# Patient Record
(demographics unavailable — no encounter records)

---

## 2025-03-31 NOTE — HISTORY OF PRESENT ILLNESS
[N] : Patient does not use contraception [Y] : Positive pregnancy history [No] : Patient does not have concerns regarding sex [Currently Active] : currently active [Men] : men [TextBox_4] : 40 -year old  2 para 1 LMP 2025, presents to establish care and to discuss management of PCOS. She reports a history of irregular menses, and amenorrhea for 1 year.  Nonetheless, she states that her menses have been regular since discontinuing breast-feeding 2024.  She was diagnosed with PCOS at age 26 based on history of oligomenorrhea and a pelvic ultrasound.  Her only other symptoms related to PCOS was acne.  She conceived status post in vitro fertilization.  She also has a history of a missed  at 8 weeks status post IUI. [Mammogramdate] : 11/2024 [BreastSonogramDate] : 11/29/2024 [PapSmeardate] : 09/2024 [LMPDate] : 03/23/2025 [[facility]] : 1 [PGHxTotal] : 2 [[facility]] : 1 [PGHxABSpont] : 1 [FreeTextEntry1] : 03/23/2025

## 2025-03-31 NOTE — HISTORY OF PRESENT ILLNESS
[N] : Patient does not use contraception [Y] : Positive pregnancy history [No] : Patient does not have concerns regarding sex [Currently Active] : currently active [Men] : men [TextBox_4] : 40 -year old  2 para 1 LMP 2025, presents to establish care and to discuss management of PCOS. She reports a history of irregular menses, and amenorrhea for 1 year.  Nonetheless, she states that her menses have been regular since discontinuing breast-feeding 2024.  She was diagnosed with PCOS at age 26 based on history of oligomenorrhea and a pelvic ultrasound.  Her only other symptoms related to PCOS was acne.  She conceived status post in vitro fertilization.  She also has a history of a missed  at 8 weeks status post IUI. [Mammogramdate] : 11/2024 [BreastSonogramDate] : 11/29/2024 [PapSmeardate] : 09/2024 [LMPDate] : 03/23/2025 [[facility] CenterxFulerm] : 1 [PGHxTotal] : 2 [[facility]iving] : 1 [PGHxABSpont] : 1 [FreeTextEntry1] : 03/23/2025

## 2025-03-31 NOTE — PHYSICAL EXAM
[Chaperone Present] : A chaperone was present in the examining room during all aspects of the physical examination [Oriented x3] : oriented x3 [Examination Of The Breasts] : a normal appearance [No Masses] : no breast masses were palpable [Labia Majora] : normal [Labia Minora] : normal [Normal] : normal [Uterine Adnexae] : normal [FreeTextEntry2] :  Appropriately responsive, alert, and no acute distress. [FreeTextEntry3] :  Thyroid is non-enlarged, nontender. No palpable nodules or goiter. No lymphadenopathy. [FreeTextEntry7] :  Soft. Nondistended. Nontender. No rebound or guarding. There are no palpable masses. [FreeTextEntry1] :  External genitalia are within normal limits with no lesions visualized. [FreeTextEntry4] : Scant amount of brown vaginal discharge consistent with recent menstruation. No blood or lesions noted within the vaginal vault. [FreeTextEntry6] : Bimanual examination was notable for a normal, nontender uterus. There were no palpable adnexal masses or adnexal tenderness. [FreeTextEntry5] :  A speculum was inserted without any difficulty. The cervix was normal in appearance. No cervical motion tenderness.

## 2025-03-31 NOTE — PLAN
[FreeTextEntry1] : 40-year-old  2 para 1-0-1-1 presents with history of polycystic ovarian syndrome.  Her presenting symptoms have been oligomenorrhea and cystic acne. Physical examination is unremarkable. Previous blood work in 2024 was notable for an elevated hemoglobin A1c, and elevated LDL.  Reviewed potential complications of PCOS.  The importance of physical exercise and proper nutrition to prevent the risk of diabetes mellitus and hyperlipidemia were discussed. Information on PCOS given. Plan: Referral to nutritionist. Follow-up blood work.   Recommendations: ophthalmological, dental, and dermatological examinations.    Status post mammography and breast ultrasound 2024. Status post pap smear 2024. Reviewed age-appropriate vaccinations.  Patient actively trying to conceive. Last pregnancy was a result of IVF. Patient will continue to track her ovulation, and actively try to conceive without assistance. Recommend ART, if unsuccessful after 6 months.   Plan: blood work, including D3 FSH, pelvic sonogram   Total time of the consultation, with greater than 50% of the time devoted to complications of PCOS, and its impact on fertility, was 45 minutes. All of the patient's questions were answered to her satisfaction.

## 2025-05-01 NOTE — HISTORY OF PRESENT ILLNESS
[Currently Active] : currently active [Men] : men [TextBox_4] : 40 -year old  2 para 1011 LMP 2025, presents complaining of amenorrhea.  The patient has been trying to conceive.  She has a history of PCOS. [PapSmeardate] : 09/2024 [PGHxTotal] : 2 [[facility]xFul[facility]] : 1 [[facility] Centeriving] : 1 [PGHxABSpont] : 1

## 2025-05-01 NOTE — HISTORY OF PRESENT ILLNESS
[Currently Active] : currently active [Men] : men [TextBox_4] : 40 -year old  2 para 1011 LMP 2025, presents complaining of amenorrhea.  The patient has been trying to conceive.  She has a history of PCOS. [PapSmeardate] : 09/2024 [PGHxTotal] : 2 [[facility] CenterxF[facility]] : 1 [[facility] Centeriving] : 1 [PGHxABSpont] : 1

## 2025-05-01 NOTE — PLAN
[FreeTextEntry1] : 40 -year old  2 para 1 LMP 2024 presents for pregnancy test secondary to absence of menses in April.  She has a history of PCOS and desires to conceive.  In-office urine pregnancy test was negative.  Will obtain blood work.  Leukorrhea.  Vaginal cultures pending.

## 2025-05-01 NOTE — PHYSICAL EXAM
[Chaperoned Physical Exam] : A chaperone was present in the examining room during all aspects of the physical examination. [Oriented x3] : oriented x3 [Labia Majora] : normal [Labia Minora] : normal [Normal] : normal [FreeTextEntry2] :  Appropriately responsive, alert, and no acute distress. [FreeTextEntry7] :  Soft. Nondistended. Nontender. No rebound or guarding. There are no palpable masses. Back: No CVA tenderness [FreeTextEntry1] :  External genitalia are within normal limits with no lesions visualized. [FreeTextEntry4] : Moderate amount of white vaginal discharge. No blood or lesions noted within the vaginal vault. [FreeTextEntry5] :  A speculum was inserted without any difficulty. The cervix was normal in appearance. Culture obtained. No cervical motion tenderness. [FreeTextEntry6] : Bimanual examination was notable for a normal, nontender uterus. There were no palpable adnexal masses or adnexal tenderness.

## 2025-05-28 NOTE — PLAN
[FreeTextEntry1] : 39 yo  LMP 2025 presents for confirmation of viable intrauterine pregnancy.  Normal physical examination.    AMA. Cultures obtained.  Plan: TVUS. PNLs. Discussed proper prenatal nutrition. Information given

## 2025-05-28 NOTE — PHYSICAL EXAM
[Chaperoned Physical Exam] : A chaperone was present in the examining room during all aspects of the physical examination. [MA] : MA [Oriented x3] : oriented x3 [Examination Of The Breasts] : a normal appearance [No Masses] : no breast masses were palpable [Labia Majora] : normal [Labia Minora] : normal [Normal] : normal [Uterine Adnexae] : normal [FreeTextEntry2] :  Appropriately responsive, alert, and no acute distress. warm/dry [FreeTextEntry3] :  Thyroid is non-enlarged, nontender. No palpable nodules or goiter. No lymphadenopathy. [FreeTextEntry7] :  Soft. Nondistended. Nontender. No rebound or guarding. There are no palpable masses. [FreeTextEntry1] :  External genitalia are within normal limits with no lesions visualized. [FreeTextEntry4] :  No vaginal discharge, blood or any lesions noted within the vaginal vault. [FreeTextEntry5] :  A speculum was inserted without any difficulty. The cervix was normal in appearance. Cultures were obtained. No cervical motion tenderness. [FreeTextEntry6] : Bimanual examination was notable for a normal, nontender uterus. There were no palpable adnexal masses or adnexal tenderness.

## 2025-05-28 NOTE — HISTORY OF PRESENT ILLNESS
[Y] : Positive pregnancy history [No] : Patient does not have concerns regarding sex [Currently Active] : currently active [Men] : men [TextBox_4] : 40 -year old  3 para 1011 LMP 2025 presents today for a confirmation of pregnancy. The patient has been trying to conceive. She is taking prenatal vitamins. She has a history of PCOS. [PapSmeardate] : 09/2024 [LMPDate] : 03/23/2025 [PGHxTotal] : 3 [[facility] CenterxFulerm] : 1 [[facility]iving] : 1 [PGHxABSpont] : 1 [FreeTextEntry1] : 03/23/2025

## 2025-06-12 NOTE — HISTORY OF PRESENT ILLNESS
[TextBox_4] : 40-year-old  3 para 1-0-1-1 LMP 2025 presents to review results of a recent pelvic sonogram.  Patient has a history of PCOS.  She is strongly desirous of a healthy pregnancy.  A transvaginal pelvic ultrasound performed 2025 confirmed a viable intrauterine pregnancy with an AGA of 5.6 weeks.  The fetal heart rate was 157.  A transvaginal pelvic ultrasound performed on 2025 was notable for an intrauterine pregnancy with measurements again consistent with a 5.6-week gestation.  In addition, the fetal heart rate was 90.  The patient denies vaginal bleeding.  Nonetheless she has experienced abdominal cramps and an episode of severe back pain.

## 2025-06-12 NOTE — PLAN
[FreeTextEntry1] : Discussion: The patient was advised that there has been no interval growth of the embryo in 1 week based on sonograms performed  and .  Furthermore, although there is fetal heart motion the fetal heart rate is now only 90, compared to 157 last week.  These findings are strongly suggestive of an impending spontaneous .  Common etiologies of early first trimester loss were reviewed.  Recommendations: Follow-up ultrasound in 1 week.  Signs and symptoms of spontaneous  were reviewed.  Total time of the consultation, which was completely devoted to review of recent pelvic ultrasounds and management to confirm spontaneous , was 20 minutes.  All the patient's questions were answered to her satisfaction.

## 2025-06-19 NOTE — HISTORY OF PRESENT ILLNESS
[Y] : Positive pregnancy history [TextBox_4] : 40-year-old  3 para 1-0-2-1 LMP 2025 presents today to follow up status post spontaneous .  The patient initially presented on several visits with lagging embryo measurements on ultrasound not consistent with her LMP.  Her last ultrasound performed 2025 revealed an embryo with no interval growth and a heart rate 90 bpm.  The patient reported heavy vaginal bleeding lower back pain and abdominal cramps since . [PapSmeardate] : 09/2024 [LMPDate] : 03/23/2025 [PGHxTotal] : 3 [[facility] CenterxFulerm] : 1 [[facility] Cliniciving] : 1 [PGHxABSpont] : 2

## 2025-06-19 NOTE — REVIEW OF SYSTEMS
[FreeTextEntry7] : Abdominal cramps [FreeTextEntry8] : Heavy vaginal bleeding [FreeTextEntry9] : Severe back pain

## 2025-06-19 NOTE — PLAN
[FreeTextEntry1] : Discussion:  Today's transvaginal ultrasound confirms the absence of an intrauterine pregnancy.  The patient was advised that the sonographic findings are consistent with a completed .  Common etiologies for first trimester spontaneous  were reviewed in depth.  The patient was advised that with advanced maternal age there is a greater risk of chromosomal anomalies.  39 yo  presents with history of heavy vaginal bleeding.  Findings on ultrasound consistent with completed .  Recommend serial beta hCGs until values less than 5.  Recommend Motrin and Tylenol to help manage back pain.  Preconceptual counseling at next visit in approximately 2 weeks.

## 2025-06-19 NOTE — PHYSICAL EXAM
[MA] : MA [Oriented x3] : oriented x3 [Labia Majora] : normal [Labia Minora] : normal [Normal] : normal [Uterine Adnexae] : normal [FreeTextEntry2] :  Appropriately responsive, alert, and no acute distress.  [FreeTextEntry7] :  Soft. Nondistended. Nontender. No rebound or guarding. There are no palpable masses. Back: No CVA tenderness.  [FreeTextEntry1] :  External genitalia are within normal limits with no lesions visualized.  [FreeTextEntry4] : Moderate amount of blood in the vault. No vaginal discharge or lesions noted within the vaginal vault.  [FreeTextEntry5] :  A speculum was inserted without any difficulty. The cervix was normal in appearance. No cervical motion tenderness.  [FreeTextEntry6] : Bimanual examination was notable for a normal, nontender uterus. There were no palpable adnexal masses or adnexal tenderness.